# Patient Record
Sex: FEMALE | Race: BLACK OR AFRICAN AMERICAN | NOT HISPANIC OR LATINO | Employment: FULL TIME | URBAN - METROPOLITAN AREA
[De-identification: names, ages, dates, MRNs, and addresses within clinical notes are randomized per-mention and may not be internally consistent; named-entity substitution may affect disease eponyms.]

---

## 2018-02-13 ENCOUNTER — HOSPITAL ENCOUNTER (EMERGENCY)
Facility: HOSPITAL | Age: 63
Discharge: HOME | End: 2018-02-13
Attending: EMERGENCY MEDICINE

## 2018-02-13 VITALS
DIASTOLIC BLOOD PRESSURE: 113 MMHG | WEIGHT: 156 LBS | SYSTOLIC BLOOD PRESSURE: 198 MMHG | BODY MASS INDEX: 30.63 KG/M2 | HEART RATE: 117 BPM | TEMPERATURE: 98 F | OXYGEN SATURATION: 99 % | HEIGHT: 60 IN | RESPIRATION RATE: 18 BRPM

## 2018-02-13 DIAGNOSIS — I15.9 SECONDARY HYPERTENSION: ICD-10-CM

## 2018-02-13 DIAGNOSIS — H00.029 MEIBOMIANITIS, UNSPECIFIED LATERALITY: Primary | ICD-10-CM

## 2018-02-13 PROCEDURE — 99283 EMERGENCY DEPT VISIT LOW MDM: CPT | Mod: 25

## 2018-02-13 PROCEDURE — 93005 ELECTROCARDIOGRAM TRACING: CPT

## 2018-02-13 PROCEDURE — 93010 ELECTROCARDIOGRAM REPORT: CPT | Mod: ,,, | Performed by: INTERNAL MEDICINE

## 2018-02-13 RX ORDER — GENTAMICIN SULFATE 3 MG/ML
2 SOLUTION/ DROPS OPHTHALMIC 4 TIMES DAILY
Qty: 15 ML | Refills: 0 | Status: SHIPPED | OUTPATIENT
Start: 2018-02-13

## 2018-02-13 RX ORDER — CLINDAMYCIN HYDROCHLORIDE 150 MG/1
300 CAPSULE ORAL 4 TIMES DAILY
Qty: 56 CAPSULE | Refills: 0 | Status: SHIPPED | OUTPATIENT
Start: 2018-02-13 | End: 2018-02-20

## 2018-02-13 NOTE — ED TRIAGE NOTES
Pt complaining of right eye swelling and drainage. Denies any fever. Pt stated her bp is elevated due to not taking her meds today.

## 2018-02-13 NOTE — ED PROVIDER NOTES
Encounter Date: 2018    SCRIBE #1 NOTE: I, Rebekah Abel, am scribing for, and in the presence of, Dr. Ca.       History     Chief Complaint   Patient presents with    Eye Problem     pain and drainage to R eye since yesterday.     This is a 62 y.o. female who  has a past medical history of Diabetes mellitus; Encounter for blood transfusion; and Hypertension.    Time seen by provider: 1:00 PM     This patient presents with complaint of right eye redness, swelling onset yesteday. The patient associates eye drainage, pain to the right eye. The patient presents hypertensive to 253/140.    Patient has a past surgical history that includes  section.       The history is provided by the patient.     Review of patient's allergies indicates:   Allergen Reactions    Sulfa (sulfonamide antibiotics)      Past Medical History:   Diagnosis Date    Diabetes mellitus     Encounter for blood transfusion     Hypertension      Past Surgical History:   Procedure Laterality Date     SECTION      x3     No family history on file.  Social History   Substance Use Topics    Smoking status: Never Smoker    Smokeless tobacco: Not on file    Alcohol use No     Review of Systems   Constitutional: Negative for chills and fever.   Eyes: Positive for pain, discharge and redness.        Eyelid swelling   All other systems reviewed and are negative.      Physical Exam     Initial Vitals [18 1213]   BP Pulse Resp Temp SpO2   (!) 240/120 (!) 117 18 98.1 °F (36.7 °C) 99 %      MAP       160         Physical Exam    Nursing note and vitals reviewed.  Constitutional: She appears well-nourished. No distress.   HENT:   Head: Normocephalic and atraumatic.   Mouth/Throat: Oropharynx is clear and moist.   Eyes: EOM are normal. Pupils are equal, round, and reactive to light.   Right upper lid with erythema with tenderness and swelling to meibomian edge.  No periorbital cellulitis.  Erythema is localized to lid margin.    Neck: Normal range of motion. Neck supple.   Musculoskeletal: Normal range of motion.   Neurological: She is alert and oriented to person, place, and time. She has normal strength.   Skin: Skin is warm and dry. No rash noted. No erythema.   Psychiatric: She has a normal mood and affect.         ED Course   Procedures  Labs Reviewed - No data to display          Medical Decision Making:   Initial Assessment:   Patient is a 62-year-old black female with a history of diabetes and hypertension presents for 2-3 days of increased swelling redness and pain to the right upper eyelid.  Differential Diagnosis:   Chalazion, stye, lid abscess, blepharitis, periorbital cellulitis  ED Management:  Examination revealed superficial inflammation and redness of the right upper meibomian glands of the lid.  There is no periorbital cellulitis noted.  The patient did not wish to have her meibomian abscess I and d. she elected to attempt to treat with warm compresses and topical as well as oral antibiotics               No exam data present       ED Course      Clinical Impression:   The primary encounter diagnosis was Meibomianitis, unspecified laterality. A diagnosis of Secondary hypertension was also pertinent to this visit.    Disposition:   Disposition: Discharged                        Ino Ca MD  02/13/18 4163

## 2018-02-17 DIAGNOSIS — I15.9 SECONDARY HYPERTENSION: Primary | ICD-10-CM

## 2018-10-26 ENCOUNTER — HOSPITAL ENCOUNTER (EMERGENCY)
Facility: HOSPITAL | Age: 63
Discharge: HOME | End: 2018-10-26
Attending: EMERGENCY MEDICINE

## 2018-10-26 VITALS
OXYGEN SATURATION: 98 % | HEIGHT: 61 IN | RESPIRATION RATE: 18 BRPM | TEMPERATURE: 98 F | HEART RATE: 75 BPM | SYSTOLIC BLOOD PRESSURE: 178 MMHG | DIASTOLIC BLOOD PRESSURE: 92 MMHG | BODY MASS INDEX: 29.48 KG/M2

## 2018-10-26 DIAGNOSIS — I10 ESSENTIAL HYPERTENSION: Primary | ICD-10-CM

## 2018-10-26 DIAGNOSIS — R07.9 CHEST PAIN: ICD-10-CM

## 2018-10-26 LAB
ALBUMIN SERPL BCP-MCNC: 3.7 G/DL
ALP SERPL-CCNC: 42 U/L
ALT SERPL W/O P-5'-P-CCNC: 19 U/L
ANION GAP SERPL CALC-SCNC: 9 MMOL/L
AST SERPL-CCNC: 30 U/L
BACTERIA #/AREA URNS HPF: ABNORMAL /HPF
BASOPHILS # BLD AUTO: 0.01 K/UL
BASOPHILS NFR BLD: 0.2 %
BILIRUB SERPL-MCNC: 0.4 MG/DL
BILIRUB UR QL STRIP: NEGATIVE
BNP SERPL-MCNC: 338 PG/ML
BUN SERPL-MCNC: 30 MG/DL
CALCIUM SERPL-MCNC: 10.4 MG/DL
CHLORIDE SERPL-SCNC: 102 MMOL/L
CLARITY UR: ABNORMAL
CO2 SERPL-SCNC: 30 MMOL/L
COLOR UR: YELLOW
CREAT SERPL-MCNC: 1.9 MG/DL
DIFFERENTIAL METHOD: ABNORMAL
EOSINOPHIL # BLD AUTO: 0 K/UL
EOSINOPHIL NFR BLD: 0.2 %
ERYTHROCYTE [DISTWIDTH] IN BLOOD BY AUTOMATED COUNT: 14.3 %
EST. GFR  (AFRICAN AMERICAN): 32 ML/MIN/1.73 M^2
EST. GFR  (NON AFRICAN AMERICAN): 27 ML/MIN/1.73 M^2
GLUCOSE SERPL-MCNC: 139 MG/DL
GLUCOSE UR QL STRIP: NEGATIVE
HCT VFR BLD AUTO: 37.6 %
HGB BLD-MCNC: 11.8 G/DL
HGB UR QL STRIP: ABNORMAL
HYALINE CASTS #/AREA URNS LPF: 1 /LPF
KETONES UR QL STRIP: NEGATIVE
LEUKOCYTE ESTERASE UR QL STRIP: NEGATIVE
LYMPHOCYTES # BLD AUTO: 2.2 K/UL
LYMPHOCYTES NFR BLD: 34.3 %
MCH RBC QN AUTO: 27.1 PG
MCHC RBC AUTO-ENTMCNC: 31.4 G/DL
MCV RBC AUTO: 86 FL
MICROSCOPIC COMMENT: ABNORMAL
MONOCYTES # BLD AUTO: 0.5 K/UL
MONOCYTES NFR BLD: 7.1 %
NEUTROPHILS # BLD AUTO: 3.7 K/UL
NEUTROPHILS NFR BLD: 58 %
NITRITE UR QL STRIP: NEGATIVE
PH UR STRIP: 8 [PH] (ref 5–8)
PLATELET # BLD AUTO: 233 K/UL
PMV BLD AUTO: 9.7 FL
POTASSIUM SERPL-SCNC: 3.5 MMOL/L
PROT SERPL-MCNC: 9.1 G/DL
PROT UR QL STRIP: ABNORMAL
RBC # BLD AUTO: 4.36 M/UL
RBC #/AREA URNS HPF: 1 /HPF (ref 0–4)
SODIUM SERPL-SCNC: 141 MMOL/L
SP GR UR STRIP: 1.02 (ref 1–1.03)
SQUAMOUS #/AREA URNS HPF: 3 /HPF
TROPONIN I SERPL DL<=0.01 NG/ML-MCNC: 0.04 NG/ML
TROPONIN I SERPL DL<=0.01 NG/ML-MCNC: 0.04 NG/ML
URN SPEC COLLECT METH UR: ABNORMAL
UROBILINOGEN UR STRIP-ACNC: NEGATIVE EU/DL
WBC # BLD AUTO: 6.3 K/UL
WBC #/AREA URNS HPF: 2 /HPF (ref 0–5)

## 2018-10-26 PROCEDURE — 84484 ASSAY OF TROPONIN QUANT: CPT | Mod: 91

## 2018-10-26 PROCEDURE — 99285 EMERGENCY DEPT VISIT HI MDM: CPT | Mod: 25

## 2018-10-26 PROCEDURE — 85025 COMPLETE CBC W/AUTO DIFF WBC: CPT

## 2018-10-26 PROCEDURE — 93010 ELECTROCARDIOGRAM REPORT: CPT | Mod: ,,, | Performed by: INTERNAL MEDICINE

## 2018-10-26 PROCEDURE — 25000003 PHARM REV CODE 250: Performed by: EMERGENCY MEDICINE

## 2018-10-26 PROCEDURE — 83880 ASSAY OF NATRIURETIC PEPTIDE: CPT

## 2018-10-26 PROCEDURE — 84484 ASSAY OF TROPONIN QUANT: CPT

## 2018-10-26 PROCEDURE — 93005 ELECTROCARDIOGRAM TRACING: CPT

## 2018-10-26 PROCEDURE — 80053 COMPREHEN METABOLIC PANEL: CPT

## 2018-10-26 PROCEDURE — 81000 URINALYSIS NONAUTO W/SCOPE: CPT

## 2018-10-26 RX ORDER — AMLODIPINE BESYLATE 5 MG/1
10 TABLET ORAL
Status: COMPLETED | OUTPATIENT
Start: 2018-10-26 | End: 2018-10-26

## 2018-10-26 RX ORDER — AMLODIPINE BESYLATE 10 MG/1
10 TABLET ORAL DAILY
Qty: 30 TABLET | Refills: 6 | Status: SHIPPED | OUTPATIENT
Start: 2018-10-26 | End: 2019-10-26

## 2018-10-26 RX ORDER — LABETALOL HYDROCHLORIDE 5 MG/ML
10 INJECTION, SOLUTION INTRAVENOUS
Status: COMPLETED | OUTPATIENT
Start: 2018-10-26 | End: 2018-10-26

## 2018-10-26 RX ORDER — LISINOPRIL 10 MG/1
20 TABLET ORAL
Status: DISCONTINUED | OUTPATIENT
Start: 2018-10-26 | End: 2018-10-26

## 2018-10-26 RX ORDER — CLONIDINE HYDROCHLORIDE 0.1 MG/1
0.1 TABLET ORAL
Status: COMPLETED | OUTPATIENT
Start: 2018-10-26 | End: 2018-10-26

## 2018-10-26 RX ORDER — HYDROCHLOROTHIAZIDE 25 MG/1
25 TABLET ORAL DAILY
Qty: 30 TABLET | Refills: 6 | Status: SHIPPED | OUTPATIENT
Start: 2018-10-26 | End: 2019-10-26

## 2018-10-26 RX ADMIN — LABETALOL HYDROCHLORIDE 10 MG: 5 INJECTION INTRAVENOUS at 09:10

## 2018-10-26 RX ADMIN — CLONIDINE HYDROCHLORIDE 0.1 MG: 0.1 TABLET ORAL at 09:10

## 2018-10-26 RX ADMIN — AMLODIPINE BESYLATE 10 MG: 5 TABLET ORAL at 01:10

## 2018-10-26 NOTE — ED NOTES
Pt arrives from her pcp's office c/o hypertension today. Pt states she ran out of her prescribed bp meds 1-3 months ago and did not have a pcp so did not get them refilled. States she does not remember which medications or how many she was taking, just remembers that she was taking lisinopril and did not like how it made her feel. Pt denies any headache, dizziness, vision changes, chest pain, or SOB today or anytime in the past few months. Daughter states she has noticed that pt has had difficulty remembering things at times for the past month or so. No neurologic deficits noted. Sinus rhythm on monitor. Pt is extremely hypertensive.

## 2018-10-26 NOTE — ED PROVIDER NOTES
"Encounter Date: 10/26/2018    SCRIBE #1 NOTE: I, Bettye Williamson, am scribing for, and in the presence of,  Dr. Nunez. I have scribed the entire note.       History     Chief Complaint   Patient presents with    Hypertension     pt presents from Dr. Jara office for high blood pressure. pt reports did not take BP meds because she is out of medication. denies headache, dizziness, chest pain, and sob.      Time seen by the provider: 9:34 AM    This is a 63 y.o. female with a PMHx of HTN and DM who presents to the Emergency Department from Dr. Jara's office for elevated blood pressure. In the ED now, her pressure is 282/152. She had been taking Lisinopril and another medication to control her BP, but she ran out several months ago. The pt is asymptomatic; states, "I feel fine." She denies headache, lightheadedness, dizziness, chest pain, shortness of breath, or abdominal pain. She reports no worsening or alleviating factors.         The history is provided by the patient.     Review of patient's allergies indicates:   Allergen Reactions    Sulfa (sulfonamide antibiotics)      Past Medical History:   Diagnosis Date    Diabetes mellitus     Encounter for blood transfusion     Hypertension      Past Surgical History:   Procedure Laterality Date     SECTION      x3     No family history on file.  Social History     Tobacco Use    Smoking status: Never Smoker   Substance Use Topics    Alcohol use: No    Drug use: Not on file     Review of Systems   Constitutional: Negative for activity change, appetite change, chills, diaphoresis, fatigue and fever.        Positive for hypertension.   HENT: Negative for sore throat.    Respiratory: Negative for cough, chest tightness and shortness of breath.    Cardiovascular: Negative for chest pain and palpitations.   Gastrointestinal: Negative for abdominal distention, abdominal pain, diarrhea, nausea and vomiting.   Endocrine: Negative for polydipsia and polyphagia. "   Genitourinary: Negative for difficulty urinating, dysuria and flank pain.   Musculoskeletal: Negative for arthralgias.   Skin: Negative for pallor and rash.   Neurological: Negative for dizziness and headaches.   Psychiatric/Behavioral: Negative for confusion.   All other systems reviewed and are negative.      Physical Exam     Initial Vitals [10/26/18 0911]   BP Pulse Resp Temp SpO2   (!) 282/168 107 20 98.1 °F (36.7 °C) 97 %      MAP       --         Physical Exam    Nursing note and vitals reviewed.  Constitutional: She appears well-developed and well-nourished. No distress.   HENT:   Head: Normocephalic and atraumatic.   Mouth/Throat: Oropharynx is clear and moist.   Eyes: Conjunctivae and EOM are normal. Pupils are equal, round, and reactive to light.   Neck: Normal range of motion. Neck supple.   Cardiovascular: Regular rhythm and normal heart sounds. Tachycardia present.  Exam reveals no gallop and no friction rub.    No murmur heard.  Pulmonary/Chest: Breath sounds normal. No respiratory distress. She has no wheezes. She has no rhonchi. She has no rales.   Abdominal: Soft. Bowel sounds are normal. She exhibits no distension. There is no tenderness.   Musculoskeletal: Normal range of motion. She exhibits no edema or tenderness.   No LE edema.   Neurological: She is alert and oriented to person, place, and time. She has normal strength. No cranial nerve deficit.   Skin: Skin is warm and dry. Capillary refill takes less than 2 seconds.   Psychiatric: She has a normal mood and affect. Her behavior is normal.         ED Course   Procedures  Labs Reviewed   CBC W/ AUTO DIFFERENTIAL - Abnormal; Notable for the following components:       Result Value    Hemoglobin 11.8 (*)     MCHC 31.4 (*)     All other components within normal limits   COMPREHENSIVE METABOLIC PANEL - Abnormal; Notable for the following components:    CO2 30 (*)     Glucose 139 (*)     BUN, Bld 30 (*)     Creatinine 1.9 (*)     Total Protein  9.1 (*)     Alkaline Phosphatase 42 (*)     eGFR if  32 (*)     eGFR if non  27 (*)     All other components within normal limits   TROPONIN I - Abnormal; Notable for the following components:    Troponin I 0.040 (*)     All other components within normal limits   B-TYPE NATRIURETIC PEPTIDE - Abnormal; Notable for the following components:     (*)     All other components within normal limits   URINALYSIS, REFLEX TO URINE CULTURE - Abnormal; Notable for the following components:    Appearance, UA Hazy (*)     Protein, UA 2+ (*)     Occult Blood UA Trace (*)     All other components within normal limits    Narrative:     Preferred Collection Type->Urine, Clean Catch   URINALYSIS MICROSCOPIC - Abnormal; Notable for the following components:    Bacteria, UA Few (*)     All other components within normal limits    Narrative:     Preferred Collection Type->Urine, Clean Catch   TROPONIN I - Abnormal; Notable for the following components:    Troponin I 0.042 (*)     All other components within normal limits     EKG Readings: (Independently Interpreted)   10:17 AM: Rate 98. Normal Sinus Rhythm. No ST elevations. Inverted T-waves in leads 1 and AVL.       Imaging Results          X-Ray Chest 1 View (Final result)  Result time 10/26/18 11:50:21    Final result by Luís Ennis Jr., MD (10/26/18 11:50:21)                 Impression:      No detrimental change.      Electronically signed by: Luís Ennis MD  Date:    10/26/2018  Time:    11:50             Narrative:    EXAMINATION:  XR CHEST 1 VIEW    CLINICAL HISTORY:  hypertension;    TECHNIQUE:  Single frontal view of the chest was performed.    COMPARISON:  July 2015.    FINDINGS:  Heart size and pulmonary vascularity is similar.  The lungs are well aerated and clear.  No pneumothorax.                                 Medical Decision Making:   Clinical Tests:   Lab Tests: Ordered and Reviewed  Radiological Study: Ordered and  Reviewed  Medical Tests: Ordered and Reviewed  ED Management:  63-year-old female with hypertension who ran out of her blood pressure medications.  She was sent here from her physician's office for an extremely elevated blood pressure.  Patient has remained completely asymptomatic here in the ED with a workup showing no end-organ damage.  I will place her back on medications and urged close follow-up with her physician as soon as able for recheck and further medication adjustment as warranted.                      Clinical Impression:     1. Essential hypertension    2. Chest pain    5        Disposition:   Disposition: Discharged  Condition: Stable    I, Dr. Glen Nunez, personally performed the services described in this documentation. All medical record entries made by the scribe were at my direction and in my presence. I have reviewed the chart and agree that the record reflects my personal performance and is accurate and complete. Glen Nunez MD.  3:12 PM 10/27/2018                     Glen Nunez MD  10/27/18 3139

## 2022-08-06 ENCOUNTER — HOSPITAL ENCOUNTER (EMERGENCY)
Facility: HOSPITAL | Age: 67
Discharge: HOME | End: 2022-08-06
Attending: EMERGENCY MEDICINE
Payer: MEDICARE

## 2022-08-06 VITALS
RESPIRATION RATE: 18 BRPM | DIASTOLIC BLOOD PRESSURE: 86 MMHG | WEIGHT: 156 LBS | HEART RATE: 86 BPM | SYSTOLIC BLOOD PRESSURE: 173 MMHG | HEIGHT: 61 IN | OXYGEN SATURATION: 100 % | BODY MASS INDEX: 29.45 KG/M2 | TEMPERATURE: 99 F

## 2022-08-06 DIAGNOSIS — R21 RASH: Primary | ICD-10-CM

## 2022-08-06 PROCEDURE — 99283 EMERGENCY DEPT VISIT LOW MDM: CPT

## 2022-08-06 NOTE — ED NOTES
"Patient presents to ED from home with c/o rash with blisters to BUE, neck, and face since Monday. Patient states she recently moved to a new house and has a concern for possible, "bed bugs". Patient states she has also been exposed to "high grass". Patient states she has not been around any other persons with rash that she is aware of and denies SOB, cough, or fever. Patient states that rash and blisters are not painful, but are itchy. Blisters appear to be closed, raised skin, with redness. No evidence of pus or drainage noted. Dr. Nunez @ bedside to assess.   "

## 2022-08-06 NOTE — ED PROVIDER NOTES
Encounter Date: 2022    SCRIBE #1 NOTE: I, Ida Durbin , am scribing for, and in the presence of, Glen Nunez MD.       History     Chief Complaint   Patient presents with    Rash     Pt presents to ED today c/o rash to BUE and neck onset x 2 days ago, unrelieved by hydrocortisone. Pt denies fever, N/V/D     Sarah Kimble is a 66 y.o. female who  has a past medical history of Diabetes mellitus, Encounter for blood transfusion, and Hypertension.    The patient presents to the ED due to a Rash. Patient reports a sudden onset of a rash that started . Her rash is on her left forearm, right wrist, and around her mouth. She states the rash itches but does not hurt. She has tried hydrocortisone cream, but has had no relief. She denies any recent travel outside of the country as well as endorses no sick contact. Additionally she denies symptoms of congestion, fever, nausea, vomiting, and other associated symptoms.         The history is provided by the patient.     Review of patient's allergies indicates:   Allergen Reactions    Sulfa (sulfonamide antibiotics)      Past Medical History:   Diagnosis Date    Diabetes mellitus     Encounter for blood transfusion     Hypertension      Past Surgical History:   Procedure Laterality Date     SECTION      x3     No family history on file.  Social History     Tobacco Use    Smoking status: Never Smoker   Substance Use Topics    Alcohol use: No     Review of Systems   HENT: Negative for congestion.    Gastrointestinal: Negative for diarrhea, nausea and vomiting.   Skin: Positive for rash.   All other systems reviewed and are negative.      Physical Exam     Initial Vitals [22 0746]   BP Pulse Resp Temp SpO2   (!) 202/97 (!) 114 19 98.1 °F (36.7 °C) 98 %      MAP       --         Physical Exam    Constitutional: She appears well-developed and well-nourished.   HENT:   Head: Normocephalic.   Right Ear: Hearing and tympanic  membrane normal.   Left Ear: Hearing and tympanic membrane normal.   Nose: Nose normal.   Mouth/Throat: Oropharynx is clear and moist.   Eyes: Lids are normal. Pupils are equal, round, and reactive to light.   Neck:   Normal range of motion.  Cardiovascular: Tachycardia present.    Pulmonary/Chest: Breath sounds normal.   Abdominal: Abdomen is soft.   Musculoskeletal:         General: Normal range of motion.      Cervical back: Normal range of motion. No rigidity.     Neurological: She is alert and oriented to person, place, and time.   Skin: Skin is warm and dry. Rash noted. Rash is papular. There is erythema.        Multiple slightly erythemas papular lesions noted tot he left forearm and right wrist   Similar lesions noted to the left side of the face below the left nare.    Psychiatric: She has a normal mood and affect. Her behavior is normal. Judgment and thought content normal.             ED Course   Procedures  Labs Reviewed   MISCELLANEOUS SENDOUT TEST, NON-BLOOD    Narrative:     [facility] Monkeypox  Release to patient->Immediate   MONKEYPOX (ORTHOPOXVIRUS) PCR          Imaging Results    None          Medications - No data to display  Medical Decision Making:   Initial Assessment:   Sarah Kimble is a 66 y.o. female who presents to the ED due to a Rash.   Differential Diagnosis:   Differential Diagnosis includes, but is not limited to:  Necrotizing fasciitis, erythema multiforme, Cintron-Lamin syndrome, toxic epidermal necrolysis, DIC, cellulitis, Staph scalded skin syndrome, toxic shock syndrome, secondary syphilis, abscess, osteomyelitis, septic joint, MRSA, DVT, superficial thrombophlebitis, varicose vein, drug eruption, allergic reaction/urticatia, irritant/contact dermatitis, viral exanthem, local trauma/contusion, monkeypox.    Clinical Tests:   Lab Tests: Ordered and Reviewed  ED Management:  I am uncertain as to the etiology of this patient's rash.  Although I have a low index of suspicion for  monkey pox, I have swabbed the lesions and they have been sent to the lab.  Patient has had no prodrome illness and no risk factors from monkey pox.  I have notified the department of health of this case.  Patient has been giving detailed instructions on isolation until results are released.  She may use Benadryl for itching.  She may return for any worsening.          Scribe Attestation:   Scribe #1: I performed the above scribed service and the documentation accurately describes the services I performed. I attest to the accuracy of the note.                 Clinical Impression:   Final diagnoses:  [R21] Rash (Primary)          ED Disposition Condition    Discharge Stable       I, Dr. Glen Nunez, personally performed the services described in this documentation. All medical record entries made by the scribe were at my direction and in my presence. I have reviewed the chart and agree that the record reflects my personal performance and is accurate and complete. Glen Nunez MD.  8:43 AM 08/06/2022    ED Prescriptions     None        Follow-up Information     Follow up With Specialties Details Why Contact Info    your primary doctor        [place] - Emergency Dept Emergency Medicine  If symptoms worsen [address] [facility] [id number] [phone]           Glen Nunez MD  08/06/22 [id number]

## 2022-08-06 NOTE — Clinical Note
"Sarah Fortunehonorio Kimble was seen and treated in our emergency department on 8/6/2022.  She may return to work after being cleared by follow-up physician .  May return to work upon negative test results for Monkey-pox virus.      If you have any questions or concerns, please don't hesitate to call.      TRACEY Ramsey RN"

## 2022-08-06 NOTE — ED NOTES
Discharge instructions and medications reviewed with patient. Patient informed on self-quarantine instructions and to keep lesions covered with protective clothing such as long sleeves until lab results are available for monkey-pox rule-out. Patient sent home with monkey-pox guideline handout and instructed to call back with any questions or report to nearest ER with emergency. Patient verbalizes understanding.

## 2022-08-10 LAB — NONVAR ORTHPX DNA SPEC QL NAA+PROBE: NORMAL
